# Patient Record
Sex: FEMALE | Race: OTHER | NOT HISPANIC OR LATINO | URBAN - METROPOLITAN AREA
[De-identification: names, ages, dates, MRNs, and addresses within clinical notes are randomized per-mention and may not be internally consistent; named-entity substitution may affect disease eponyms.]

---

## 2018-10-25 ENCOUNTER — EMERGENCY (EMERGENCY)
Facility: HOSPITAL | Age: 58
LOS: 1 days | Discharge: HOME | End: 2018-10-25
Attending: EMERGENCY MEDICINE
Payer: SELF-PAY

## 2018-10-25 VITALS
HEART RATE: 88 BPM | TEMPERATURE: 98 F | SYSTOLIC BLOOD PRESSURE: 158 MMHG | DIASTOLIC BLOOD PRESSURE: 88 MMHG | OXYGEN SATURATION: 99 % | RESPIRATION RATE: 18 BRPM

## 2018-10-25 VITALS — WEIGHT: 160.06 LBS | HEIGHT: 65 IN

## 2018-10-25 DIAGNOSIS — Z98.890 OTHER SPECIFIED POSTPROCEDURAL STATES: Chronic | ICD-10-CM

## 2018-10-25 LAB
ALBUMIN SERPL ELPH-MCNC: 3.8 G/DL — SIGNIFICANT CHANGE UP (ref 3.5–5)
ALP SERPL-CCNC: 60 U/L — SIGNIFICANT CHANGE UP (ref 40–120)
ALT FLD-CCNC: 32 U/L DA — SIGNIFICANT CHANGE UP (ref 10–60)
ANION GAP SERPL CALC-SCNC: 7 MMOL/L — SIGNIFICANT CHANGE UP (ref 5–17)
APTT BLD: 31.4 SEC — SIGNIFICANT CHANGE UP (ref 27.5–37.4)
AST SERPL-CCNC: 25 U/L — SIGNIFICANT CHANGE UP (ref 10–40)
BASOPHILS # BLD AUTO: 0.1 K/UL — SIGNIFICANT CHANGE UP (ref 0–0.2)
BASOPHILS NFR BLD AUTO: 1.3 % — SIGNIFICANT CHANGE UP (ref 0–2)
BILIRUB SERPL-MCNC: 0.5 MG/DL — SIGNIFICANT CHANGE UP (ref 0.2–1.2)
BUN SERPL-MCNC: 10 MG/DL — SIGNIFICANT CHANGE UP (ref 7–18)
CALCIUM SERPL-MCNC: 9.3 MG/DL — SIGNIFICANT CHANGE UP (ref 8.4–10.5)
CHLORIDE SERPL-SCNC: 100 MMOL/L — SIGNIFICANT CHANGE UP (ref 96–108)
CO2 SERPL-SCNC: 28 MMOL/L — SIGNIFICANT CHANGE UP (ref 22–31)
CREAT SERPL-MCNC: 0.57 MG/DL — SIGNIFICANT CHANGE UP (ref 0.5–1.3)
EOSINOPHIL # BLD AUTO: 0.1 K/UL — SIGNIFICANT CHANGE UP (ref 0–0.5)
EOSINOPHIL NFR BLD AUTO: 1.3 % — SIGNIFICANT CHANGE UP (ref 0–6)
GLUCOSE SERPL-MCNC: 97 MG/DL — SIGNIFICANT CHANGE UP (ref 70–99)
HCT VFR BLD CALC: 39.7 % — SIGNIFICANT CHANGE UP (ref 34.5–45)
HGB BLD-MCNC: 13.2 G/DL — SIGNIFICANT CHANGE UP (ref 11.5–15.5)
INR BLD: 0.98 RATIO — SIGNIFICANT CHANGE UP (ref 0.88–1.16)
LYMPHOCYTES # BLD AUTO: 2 K/UL — SIGNIFICANT CHANGE UP (ref 1–3.3)
LYMPHOCYTES # BLD AUTO: 23.4 % — SIGNIFICANT CHANGE UP (ref 13–44)
MCHC RBC-ENTMCNC: 30 PG — SIGNIFICANT CHANGE UP (ref 27–34)
MCHC RBC-ENTMCNC: 33.4 GM/DL — SIGNIFICANT CHANGE UP (ref 32–36)
MCV RBC AUTO: 90 FL — SIGNIFICANT CHANGE UP (ref 80–100)
MONOCYTES # BLD AUTO: 0.4 K/UL — SIGNIFICANT CHANGE UP (ref 0–0.9)
MONOCYTES NFR BLD AUTO: 5.1 % — SIGNIFICANT CHANGE UP (ref 2–14)
NEUTROPHILS # BLD AUTO: 5.7 K/UL — SIGNIFICANT CHANGE UP (ref 1.8–7.4)
NEUTROPHILS NFR BLD AUTO: 68.9 % — SIGNIFICANT CHANGE UP (ref 43–77)
PLATELET # BLD AUTO: 218 K/UL — SIGNIFICANT CHANGE UP (ref 150–400)
POTASSIUM SERPL-MCNC: 3.9 MMOL/L — SIGNIFICANT CHANGE UP (ref 3.5–5.3)
POTASSIUM SERPL-SCNC: 3.9 MMOL/L — SIGNIFICANT CHANGE UP (ref 3.5–5.3)
PROT SERPL-MCNC: 7.7 G/DL — SIGNIFICANT CHANGE UP (ref 6–8.3)
PROTHROM AB SERPL-ACNC: 10.7 SEC — SIGNIFICANT CHANGE UP (ref 9.8–12.7)
RBC # BLD: 4.41 M/UL — SIGNIFICANT CHANGE UP (ref 3.8–5.2)
RBC # FLD: 12.1 % — SIGNIFICANT CHANGE UP (ref 10.3–14.5)
SODIUM SERPL-SCNC: 135 MMOL/L — SIGNIFICANT CHANGE UP (ref 135–145)
WBC # BLD: 8.3 K/UL — SIGNIFICANT CHANGE UP (ref 3.8–10.5)
WBC # FLD AUTO: 8.3 K/UL — SIGNIFICANT CHANGE UP (ref 3.8–10.5)

## 2018-10-25 PROCEDURE — 99284 EMERGENCY DEPT VISIT MOD MDM: CPT | Mod: 25

## 2018-10-25 PROCEDURE — 99283 EMERGENCY DEPT VISIT LOW MDM: CPT

## 2018-10-25 PROCEDURE — 85730 THROMBOPLASTIN TIME PARTIAL: CPT

## 2018-10-25 PROCEDURE — 80053 COMPREHEN METABOLIC PANEL: CPT

## 2018-10-25 PROCEDURE — 85027 COMPLETE CBC AUTOMATED: CPT

## 2018-10-25 PROCEDURE — 74177 CT ABD & PELVIS W/CONTRAST: CPT

## 2018-10-25 PROCEDURE — 74177 CT ABD & PELVIS W/CONTRAST: CPT | Mod: 26

## 2018-10-25 PROCEDURE — 85610 PROTHROMBIN TIME: CPT

## 2018-10-25 NOTE — ED PROVIDER NOTE - OBJECTIVE STATEMENT
59 y/o F pt w/ hx of ovary removal presents c/o periumbilical pain radiating to the R side x today. Pain rated at 8/10. No hx of similar sx. Denies dysuria, diarrhea. Had colonoscopy 5 yrs ago which showed 2 polyps. Pt is post menopausal. Denies and any other complaints. NKDA.

## 2018-10-25 NOTE — ED PROVIDER NOTE - PROGRESS NOTE DETAILS
Discussed lab/rad findings with patient, follow up with GI. Pt is well appearing walking with steady gait, stable for discharge and follow up without fail with medical doctor. I had a detailed discussion with the patient and/or guardian regarding the historical points, exam findings, and any diagnostic results supporting the discharge diagnosis. Pt educated on care and need for follow up. Strict return instructions and red flag signs and symptoms discussed with patient. Questions answered. Pt shows understanding of discharge information and agrees to follow.

## 2018-10-25 NOTE — ED ADULT NURSE NOTE - OBJECTIVE STATEMENT
pt is here for abdominal pain.  c/o pain 8/10, pt stated that starting yesterday, sending by PMD office for RLQ abdominal CT scan, denied fever or N/V/D, pt calm at this time.

## 2018-10-25 NOTE — ED PROVIDER NOTE - MEDICAL DECISION MAKING DETAILS
Pt w/ abd pain. Impression is appendicitis vs ovarian cyst vs fibroid uterus. Will do IV fluids, pain control, and CT scan abd/pelvis w/ contrast.

## 2018-10-25 NOTE — ED STATDOCS - OBJECTIVE STATEMENT
Telemedicine assessment was conducted (using real time 2 way audio-video technology) by Dr. Rob Rose located at [address] [place] [address]  ++++++++++++++++++++++++  Pertinent patient history and initial plan:  59 y/o F with a PMHx of HTN and a PSHx of ovarian cyst removal presents to ED c/o abd pain that is radiating to right side of abd from belly button x last night. This morning it was resolved but returned while at work. Pt went to PMD and referred her to ED for CT scan . Pt denies nausea, vomiting, fever, chills. Pt endorses loss of appetite. Drug Allergy: Penicillin.   Plan: Intake, CT-abd, Blood work, pt does not want meds for nausea or pain.       Patient seen by me in intake for initial assessment and ordering. Physician on site to follow results and further evaluate and treat patient. Telemedicine assessment was conducted (using real time 2 way audio-video technology) by Dr. Rob Rose located at [address] [place] [address]  ++++++++++++++++++++++++  Pertinent patient history and initial plan:  57 y/o F with a PMHx of HTN and a PSHx of ovarian cyst removal presents to ED c/o abd pain that is radiating to right side of abd from belly button x last night. This morning it was resolved but returned while at work. Pt went to PMD and referred her to ED for CT scan . Pt denies nausea, vomiting, fever, chills. Pt endorses loss of appetite. Drug Allergy: Penicillin.     patient well appearing, awake, alert, points to RLQ as to where all her pain is located    Plan: Intake, CT-abd/pelvis r/o appendicitis, Blood work, pt does not want meds for nausea or pain.       Patient seen by me in intake for initial assessment and ordering. Physician on site to follow results and further evaluate and treat patient.

## 2022-04-04 NOTE — ED PROVIDER NOTE - NS ED ATTENDING STATEMENT MOD
Initiate Treatment: clobetasol 0.05 % topical cream BID. Detail Level: Zone Render In Strict Bullet Format?: No I have personally performed a face to face diagnostic evaluation on this patient. I have reviewed the ACP note and agree with the history, exam and plan of care, except as noted.

## 2022-05-24 NOTE — ED PROVIDER NOTE - CPE EDP GASTRO NORM
ASSESSMENT AND PLAN:     Skin cancer screening   Discussed with patient no lesions concerning for NMSC (non-melanoma skin cancer) or other skin cancers.  Suggest sunscreens, monthly self-examinations, and yearly total skin exam:  May 2023. Patient to watch for the ABCDE's of pigmented lesions or persistent crusts, erosions, irritated areas.  Technique of skin self-exam discussed with patient and given the AAD ([employer]) information on this.   Observe closely for skin damage/changes, and call if such occurs.    Benign Nevi/moles  Please monitor your moles once monthly for any:  Asymmetry  Border irregularity  Color change or multiple colors  Diameter that is changing  Evolution (any change at all)   If any of the moles concern you please do not hesitate to call our office so that we may reassess the moles.  Also feel free to call our office to reassess any new moles that you notice.  Wear an SPF of at least 30 daily and especially during any sun exposure.    Minimize sun exposure between the hours of 10 AM and 3 PM, when the sun's rays are the strongest.      Lipoma- LT shin  Reassured patient lesion benign, no concern for malignancy and no malignant potential.  No treatment required.      Seborrheic Keratosis - RT temple   Stuck-on hyperkeratotic, tan waxy papules.  Discussed with patient lesion benign, no concern for malignancy today.  Call if change.    Angioma - chest   Uniform dome-shaped 1-2 mm red papules.  Discussed benign nature of lesions.  Follow.  Call if change.        Follow up or Call sooner if any problems or concerns.    
- - -

## 2024-07-09 NOTE — ED ADULT NURSE NOTE - NS ED NURSE RECORD ANOTHER HT AND WT
Detail Level: Generalized Topical Retinoids Recommendations: Pea sized amount of retinoid in evenings (at least 10 mins after washing face) as tolerated Detail Level: Zone Nicotinamide Supplementation Recommendations: Heliocare Advanced Daily Sunscreen Recommendations: Eryfotona Actinica or Eryfotona Ageless can improve appearance of skin and slow/decrease growth or pre-cancerous lesions Sunscreen Recommendations: SPF 30+ or higher daily Ipl Recommendations: Consult with  Chemical Peel Recommendations: VI peel Bleaching Agents Recommendations: Hydroquinone should be used 3 months on 3 months off\\nIf used continually it can cause pigmentation in the skin that will never resolve Skin Checks Recommendations: monthly Sunscreen Recommendations: Eryfotona Actinica Daily \\n(if prolonged sun exposure reapply with any SPF 30+ sunscreen) Yes